# Patient Record
Sex: MALE | Race: WHITE | URBAN - METROPOLITAN AREA
[De-identification: names, ages, dates, MRNs, and addresses within clinical notes are randomized per-mention and may not be internally consistent; named-entity substitution may affect disease eponyms.]

---

## 2023-06-27 ENCOUNTER — TELEPHONE (OUTPATIENT)
Dept: UROLOGY | Facility: AMBULATORY SURGERY CENTER | Age: 63
End: 2023-06-27

## 2023-06-27 NOTE — TELEPHONE ENCOUNTER
New Patient    What is the reason for the patient’s appointment?: urinary retention for several months     What office location does the patient prefer?: [place]     Does patient have Imaging/Lab Results: no     Have patient records been requested?:  If No, are the records showing in Epic: no records for pt       INSURANCE:   Do we accept the patient's insurance or is the patient Self-Pay?: yes     Insurance Provider:   Plan Type/Number:   Member ID#:       HISTORY:   Has the patient had any previous Urologist(s)?: no     Was the patient seen in the ED?:     Has the patient had any outside testing done?:     Does the patient have a personal history of cancer?:       Pt call got disconnected before appt could be made

## 2023-07-27 ENCOUNTER — OFFICE VISIT (OUTPATIENT)
Dept: UROLOGY | Facility: CLINIC | Age: 63
End: 2023-07-27
Payer: COMMERCIAL

## 2023-07-27 VITALS
BODY MASS INDEX: 31.01 KG/M2 | OXYGEN SATURATION: 96 % | DIASTOLIC BLOOD PRESSURE: 78 MMHG | HEART RATE: 119 BPM | HEIGHT: 74 IN | RESPIRATION RATE: 16 BRPM | SYSTOLIC BLOOD PRESSURE: 132 MMHG | WEIGHT: 241.6 LBS

## 2023-07-27 DIAGNOSIS — R33.9 URINARY RETENTION: Primary | ICD-10-CM

## 2023-07-27 DIAGNOSIS — Z12.5 SCREENING FOR MALIGNANT NEOPLASM OF PROSTATE: ICD-10-CM

## 2023-07-27 DIAGNOSIS — N49.2 SCROTAL ABSCESS: ICD-10-CM

## 2023-07-27 LAB — POST-VOID RESIDUAL VOLUME, ML POC: 95 ML

## 2023-07-27 PROCEDURE — 51798 US URINE CAPACITY MEASURE: CPT | Performed by: UROLOGY

## 2023-07-27 PROCEDURE — 99204 OFFICE O/P NEW MOD 45 MIN: CPT | Performed by: UROLOGY

## 2023-07-27 RX ORDER — BENAZEPRIL HYDROCHLORIDE 10 MG/1
TABLET ORAL
COMMUNITY
Start: 2023-04-28

## 2023-07-27 RX ORDER — IPRATROPIUM BROMIDE 42 UG/1
SPRAY, METERED NASAL
COMMUNITY
Start: 2023-07-21

## 2023-07-27 RX ORDER — BUPRENORPHINE AND NALOXONE 2; .5 MG/1; MG/1
FILM, SOLUBLE BUCCAL; SUBLINGUAL
COMMUNITY
Start: 2023-07-19

## 2023-07-27 RX ORDER — TAMSULOSIN HYDROCHLORIDE 0.4 MG/1
CAPSULE ORAL
COMMUNITY
Start: 2023-06-26
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
## 2023-11-06 ENCOUNTER — TELEPHONE (OUTPATIENT)
Age: 63
End: 2023-11-06

## 2023-11-06 NOTE — TELEPHONE ENCOUNTER
Pt called and stated that he has [address] scheduled for 11/14/23 and review [facility] [place] schedule next available apt 3/2024.  Please review when pt can be seen to review US results     PT call [phone]

## 2023-11-07 NOTE — TELEPHONE ENCOUNTER
US is on 11/14/23. Imaging has been taking 2-3 weeks to come back. December follow up ok. ER precautions in meantime.  Thanks